# Patient Record
(demographics unavailable — no encounter records)

---

## 2024-11-25 NOTE — PHYSICAL EXAM
[de-identified] : Gen: NAD Head: NC/AT Eyes: wears glasses, no scleral icterus ENT: mucous membranes moist CV: No JVD Lungs: nonlabored breathing Abd: soft, NT/ND Ext: RLE: very limited flexion, TTP in the medial and lateral knee; LLE: RLE: very limited flexion, TTP in the medial and lateral knee Back: +TTP in the bilateral low lumbar facet region, +SLR bilaterally Neuro: CN intact LEs +5 L +5 R hip flexion +5 L +5 R leg extension +5 L +5 R leg flexion +5 L +5 R foot dorsiflexion +5 L +5 R foot plantarflexion +5 L +5 R EHL extension Psych: normal affect Skin: no visible lesions

## 2024-11-25 NOTE — PHYSICAL EXAM
[de-identified] : Gen: NAD Head: NC/AT Eyes: wears glasses, no scleral icterus ENT: mucous membranes moist CV: No JVD Lungs: nonlabored breathing Abd: soft, NT/ND Ext: RLE: very limited flexion, TTP in the medial and lateral knee; LLE: RLE: very limited flexion, TTP in the medial and lateral knee Back: +TTP in the bilateral low lumbar facet region, +SLR bilaterally Neuro: CN intact LEs +5 L +5 R hip flexion +5 L +5 R leg extension +5 L +5 R leg flexion +5 L +5 R foot dorsiflexion +5 L +5 R foot plantarflexion +5 L +5 R EHL extension Psych: normal affect Skin: no visible lesions

## 2024-11-25 NOTE — HISTORY OF PRESENT ILLNESS
[8] : 8 [Burning] : burning [Dull/Aching] : dull/aching [Radiating] : radiating [Stabbing] : stabbing [Throbbing] : throbbing [Tightness] : tightness [Tingling] : tingling [Intermittent] : intermittent [Household chores] : household chores [Leisure] : leisure [Sleep] : sleep [Physical therapy] : physical therapy [Injection therapy] : injection therapy [Walking] : walking [] : no [FreeTextEntry1] : bilateral knee pain  [FreeTextEntry6] : numbness and tingling in bilateral feet  [FreeTextEntry7] : Bilateral feet  [de-identified] : est 2017 [de-identified] : x-ray and MRI @[facility]

## 2024-11-25 NOTE — PHYSICAL EXAM
[de-identified] : Gen: NAD Head: NC/AT Eyes: wears glasses, no scleral icterus ENT: mucous membranes moist CV: No JVD Lungs: nonlabored breathing Abd: soft, NT/ND Ext: RLE: very limited flexion, TTP in the medial and lateral knee; LLE: RLE: very limited flexion, TTP in the medial and lateral knee Back: +TTP in the bilateral low lumbar facet region, +SLR bilaterally Neuro: CN intact LEs +5 L +5 R hip flexion +5 L +5 R leg extension +5 L +5 R leg flexion +5 L +5 R foot dorsiflexion +5 L +5 R foot plantarflexion +5 L +5 R EHL extension Psych: normal affect Skin: no visible lesions

## 2024-11-25 NOTE — DISCUSSION/SUMMARY
[de-identified] : ALEX ORR is a 83 year-old man presenting for a NPV for a history of low back and bilateral knee pain.    Prior treatment: Physical therapy x2 months CSI bilateral knees Lumbar DIANA  Patient has participated and failed at least 6 weeks of conservative therapy including physical therapy and a prescribed home exercise program as well as 6 weeks of activity modifications, including heat, ice, rest, and over the counter medications.   Plan: 1) MRI lumbar spine images reviewed with the patient. 2) Consider L4-L5 ILESI in the future.  3) Consider bilateral CSI knees in the future. Patient had this done approx 45 days ago so will defer.  4) Continue acetaminophen prn 5) RTC 6 weeks

## 2024-11-25 NOTE — DATA REVIEWED
[MRI] : MRI [Lumbar Spine] : lumbar spine [Report was reviewed and noted in the chart] : The report was reviewed and noted in the chart [I independently reviewed and interpreted images and report] : I independently reviewed and interpreted images and report [FreeTextEntry1] : 5/22/2024 X-ray bilateral knees ([facility]) IMPRESSION: No acute fracture or dislocation. Mild left medial compartment joint space narrowing. Small scattered marginal osteophytes. No osseous erosion or periosteal reaction. Trace/small right and moderate left knee joint effusions, nonspecific. No focal soft tissue abnormality.  5/24/2024 MRI Lumbar Spine ([facility]) FINDINGS:    RETROPERITONEUM AND SOFT TISSUES:  Retroperitoneal structures demonstrate normal appearance.  Paraspinal musculature demonstrates symmetric size and signal.  No superficial soft tissue abnormalities identified.  OSSEOUS STRUCTURES:  Vertebral bodies demonstrate normal marrow signal.  Normal alignment. Disc desiccation and spondylosis in the lower lumbar spine. Facet arthropathy in the mid to lower lumbar spine. Slight retrolisthesis of on L3 on L4. No evidence for aggressive osseous lesions appreciated. No evidence for fracture.  Conus medullaris noted posterior to T12-L1.  L1-L2 level demonstrates no disc bulge or herniation. No foraminal impingement or canal stenosis.  L2-L3 level demonstrates no disc bulge or herniation. No foraminal impingement or canal stenosis.  L3-L4 level demonstrates slight retrolisthesis with a disc bulge and annular fissure causing impingement upon the anterior thecal sac and bilateral neural foramina. Contact with the anterior bilateral L4 nerve roots.  L4-L5 level demonstrates a disc bulge with annular fissure causing impingement upon the anterior thecal sac and bilateral neural foramina. Contact with the anterior bilateral L5 nerve roots.  L5-S1 level demonstrates a disc bulge with annular fissure causing impingement upon the anterior thecal sac and bilateral neural foramina. Contact with the exiting bilateral L5 spinal nerves and anterior S1 nerve roots.  IMPRESSION:   1.  L3-L4 level demonstrates slight retrolisthesis with a disc bulge and annular fissure causing impingement upon the anterior thecal sac and bilateral neural foramina. Contact with the anterior bilateral L4 nerve roots. 2.  L4-L5 level demonstrates a disc bulge with annular fissure causing impingement upon the anterior thecal sac and bilateral neural foramina. Contact with the anterior bilateral L5 nerve roots. 3.  L5-S1 level demonstrates a disc bulge with annular fissure causing impingement upon the anterior thecal sac and bilateral neural foramina. Contact with the exiting bilateral L5 spinal nerves and anterior S1 nerve roots.

## 2024-11-25 NOTE — DISCUSSION/SUMMARY
[de-identified] : ALEX ORR is a 83 year-old man presenting for a NPV for a history of low back and bilateral knee pain.    Prior treatment: Physical therapy x2 months CSI bilateral knees Lumbar DIANA  Patient has participated and failed at least 6 weeks of conservative therapy including physical therapy and a prescribed home exercise program as well as 6 weeks of activity modifications, including heat, ice, rest, and over the counter medications.   Plan: 1) MRI lumbar spine images reviewed with the patient. 2) Consider L4-L5 ILESI in the future.  3) Consider bilateral CSI knees in the future. Patient had this done approx 45 days ago so will defer.  4) Continue acetaminophen prn 5) RTC 6 weeks

## 2024-11-25 NOTE — DISCUSSION/SUMMARY
[de-identified] : ALEX ORR is a 83 year-old man presenting for a NPV for a history of low back and bilateral knee pain.    Prior treatment: Physical therapy x2 months CSI bilateral knees Lumbar DIANA  Patient has participated and failed at least 6 weeks of conservative therapy including physical therapy and a prescribed home exercise program as well as 6 weeks of activity modifications, including heat, ice, rest, and over the counter medications.   Plan: 1) MRI lumbar spine images reviewed with the patient. 2) Consider L4-L5 ILESI in the future.  3) Consider bilateral CSI knees in the future. Patient had this done approx 45 days ago so will defer.  4) Continue acetaminophen prn 5) RTC 6 weeks

## 2024-11-25 NOTE — HISTORY OF PRESENT ILLNESS
[8] : 8 [Burning] : burning [Dull/Aching] : dull/aching [Radiating] : radiating [Stabbing] : stabbing [Throbbing] : throbbing [Tightness] : tightness [Tingling] : tingling [Intermittent] : intermittent [Household chores] : household chores [Leisure] : leisure [Sleep] : sleep [Physical therapy] : physical therapy [Injection therapy] : injection therapy [Walking] : walking [] : no [FreeTextEntry1] : bilateral knee pain  [FreeTextEntry7] : Bilateral feet  [FreeTextEntry6] : numbness and tingling in bilateral feet  [de-identified] : est 2017 [de-identified] : x-ray and MRI @[facility]

## 2025-01-07 NOTE — PHYSICAL EXAM
[de-identified] : Gen: NAD Head: NC/AT Eyes: wears glasses, no scleral icterus ENT: mucous membranes moist CV: No JVD Lungs: nonlabored breathing Abd: soft, NT/ND Ext: RLE: very limited flexion, TTP in the medial and lateral knee; LLE: RLE: very limited flexion, TTP in the medial and lateral knee Back: +TTP in the bilateral low lumbar facet region, +SLR bilaterally Neuro: CN intact LEs +5 L +5 R hip flexion +5 L +5 R leg extension +5 L +5 R leg flexion +5 L +5 R foot dorsiflexion +5 L +5 R foot plantarflexion +5 L +5 R EHL extension Psych: normal affect Skin: no visible lesions

## 2025-01-07 NOTE — HISTORY OF PRESENT ILLNESS
[8] : 8 [Burning] : burning [Dull/Aching] : dull/aching [Radiating] : radiating [Stabbing] : stabbing [Throbbing] : throbbing [Tightness] : tightness [Tingling] : tingling [Intermittent] : intermittent [Household chores] : household chores [Leisure] : leisure [Sleep] : sleep [Physical therapy] : physical therapy [Injection therapy] : injection therapy [Walking] : walking [10] : 10 [] : no [FreeTextEntry1] : bilateral knee pain  [FreeTextEntry6] : numbness and tingling in bilateral feet  [FreeTextEntry7] : Bilateral feet  [de-identified] : est 2017 [de-identified] : x-ray and MRI @[facility]

## 2025-01-07 NOTE — PROCEDURE
[Large Joint Injection] : Large joint injection [Bilateral] : bilaterally of the [Knee] : knee [Pain] : pain [Alcohol] : alcohol [Ethyl Chloride sprayed topically] : ethyl chloride sprayed topically [___ cc    0.25%] : Bupivacaine (Marcaine) ~Vcc of 0.25%  [___ cc    80mg] : Methylprednisolone (Depomedrol) ~Vcc of 80 mg  [] : Patient tolerated procedure well

## 2025-01-07 NOTE — DISCUSSION/SUMMARY
[de-identified] : ALEX ORR is a 83 year-old man presenting for a RPV for a history of low back and bilateral knee pain.    Prior treatment: Physical therapy x2 months CSI bilateral knees Lumbar DIANA  Patient has participated and failed at least 6 weeks of conservative therapy including physical therapy and a prescribed home exercise program as well as 6 weeks of activity modifications, including heat, ice, rest, and over the counter medications.   Plan: 1) MRI lumbar spine images reviewed with the patient. 2) Consider L4-L5 ILESI in the future.  3) Bilateral knee CSI today. The procedure was explained to the patient in detail. Reviewed risks, benefits, and alternatives with the patient. Some risks discussed included temporary increase in pain, bleeding, infection, and side effects from steroids. The patient expressed understanding and would like to proceed.  4) Continue acetaminophen prn 5) RTC 3 months

## 2025-01-07 NOTE — HISTORY OF PRESENT ILLNESS
[8] : 8 [Burning] : burning [Dull/Aching] : dull/aching [Radiating] : radiating [Stabbing] : stabbing [Throbbing] : throbbing [Tightness] : tightness [Tingling] : tingling [Intermittent] : intermittent [Household chores] : household chores [Leisure] : leisure [Sleep] : sleep [Physical therapy] : physical therapy [Injection therapy] : injection therapy [Walking] : walking [10] : 10 [] : no [FreeTextEntry1] : bilateral knee pain  [FreeTextEntry6] : numbness and tingling in bilateral feet  [FreeTextEntry7] : Bilateral feet  [de-identified] : est 2017 [de-identified] : x-ray and MRI @[facility]

## 2025-01-07 NOTE — PHYSICAL EXAM
[de-identified] : Gen: NAD Head: NC/AT Eyes: wears glasses, no scleral icterus ENT: mucous membranes moist CV: No JVD Lungs: nonlabored breathing Abd: soft, NT/ND Ext: RLE: very limited flexion, TTP in the medial and lateral knee; LLE: RLE: very limited flexion, TTP in the medial and lateral knee Back: +TTP in the bilateral low lumbar facet region, +SLR bilaterally Neuro: CN intact LEs +5 L +5 R hip flexion +5 L +5 R leg extension +5 L +5 R leg flexion +5 L +5 R foot dorsiflexion +5 L +5 R foot plantarflexion +5 L +5 R EHL extension Psych: normal affect Skin: no visible lesions

## 2025-01-07 NOTE — DISCUSSION/SUMMARY
[de-identified] : ALEX ORR is a 83 year-old man presenting for a RPV for a history of low back and bilateral knee pain.    Prior treatment: Physical therapy x2 months CSI bilateral knees Lumbar DIANA  Patient has participated and failed at least 6 weeks of conservative therapy including physical therapy and a prescribed home exercise program as well as 6 weeks of activity modifications, including heat, ice, rest, and over the counter medications.   Plan: 1) MRI lumbar spine images reviewed with the patient. 2) Consider L4-L5 ILESI in the future.  3) Bilateral knee CSI today. The procedure was explained to the patient in detail. Reviewed risks, benefits, and alternatives with the patient. Some risks discussed included temporary increase in pain, bleeding, infection, and side effects from steroids. The patient expressed understanding and would like to proceed.  4) Continue acetaminophen prn 5) RTC 3 months

## 2025-04-10 NOTE — PHYSICAL EXAM
[de-identified] : Gen: NAD Head: NC/AT Eyes: wears glasses, no scleral icterus ENT: mucous membranes moist CV: No JVD Lungs: nonlabored breathing Abd: soft, NT/ND Ext: RLE: very limited flexion, TTP in the medial and lateral knee; LLE: RLE: very limited flexion, TTP in the medial and lateral knee Back: +TTP in the bilateral low lumbar facet region, +SLR bilaterally Neuro: CN intact LEs +5 L +5 R hip flexion +5 L +5 R leg extension +5 L +5 R leg flexion +5 L +5 R foot dorsiflexion +5 L +5 R foot plantarflexion +5 L +5 R EHL extension Psych: normal affect Skin: no visible lesions

## 2025-04-10 NOTE — HISTORY OF PRESENT ILLNESS
[10] : 10 [8] : 8 [Burning] : burning [Dull/Aching] : dull/aching [Radiating] : radiating [Stabbing] : stabbing [Throbbing] : throbbing [Tightness] : tightness [Tingling] : tingling [Intermittent] : intermittent [Household chores] : household chores [Leisure] : leisure [Sleep] : sleep [Physical therapy] : physical therapy [Injection therapy] : injection therapy [Walking] : walking [9] : 9 [7] : 7 [] : no [FreeTextEntry1] : bilateral knee pain  [FreeTextEntry6] : numbness and tingling in bilateral feet  [FreeTextEntry7] : Bilateral feet  [de-identified] : est 2017 [de-identified] : x-ray and MRI @[facility]

## 2025-04-10 NOTE — DISCUSSION/SUMMARY
[de-identified] : ALEX ORR is a 83 year-old man presenting for a RPV for a history of low back and bilateral knee pain.    Prior treatment: Physical therapy x2 months 1/7/2025: CSI bilateral knees Lumbar DIANA  Patient has participated and failed at least 6 weeks of conservative therapy including physical therapy and a prescribed home exercise program as well as 6 weeks of activity modifications, including heat, ice, rest, and over the counter medications.   Plan: 1) MRI lumbar spine images reviewed with the patient. 2) Consider L4-L5 ILESI in the future.  3) Bilateral knee CSI today. The procedure was explained to the patient in detail. Reviewed risks, benefits, and alternatives with the patient. Some risks discussed included temporary increase in pain, bleeding, infection, and side effects from steroids. The patient expressed understanding and would like to proceed.  4) Continue acetaminophen prn 5) RTC 3 months

## 2025-04-10 NOTE — DISCUSSION/SUMMARY
[de-identified] : ALEX ORR is a 83 year-old man presenting for a RPV for a history of low back and bilateral knee pain.    Prior treatment: Physical therapy x2 months 1/7/2025: CSI bilateral knees Lumbar DIANA  Patient has participated and failed at least 6 weeks of conservative therapy including physical therapy and a prescribed home exercise program as well as 6 weeks of activity modifications, including heat, ice, rest, and over the counter medications.   Plan: 1) MRI lumbar spine images reviewed with the patient. 2) Consider L4-L5 ILESI in the future.  3) Bilateral knee CSI today. The procedure was explained to the patient in detail. Reviewed risks, benefits, and alternatives with the patient. Some risks discussed included temporary increase in pain, bleeding, infection, and side effects from steroids. The patient expressed understanding and would like to proceed.  4) Continue acetaminophen prn 5) RTC 3 months

## 2025-04-10 NOTE — PHYSICAL EXAM
[de-identified] : Gen: NAD Head: NC/AT Eyes: wears glasses, no scleral icterus ENT: mucous membranes moist CV: No JVD Lungs: nonlabored breathing Abd: soft, NT/ND Ext: RLE: very limited flexion, TTP in the medial and lateral knee; LLE: RLE: very limited flexion, TTP in the medial and lateral knee Back: +TTP in the bilateral low lumbar facet region, +SLR bilaterally Neuro: CN intact LEs +5 L +5 R hip flexion +5 L +5 R leg extension +5 L +5 R leg flexion +5 L +5 R foot dorsiflexion +5 L +5 R foot plantarflexion +5 L +5 R EHL extension Psych: normal affect Skin: no visible lesions

## 2025-04-10 NOTE — HISTORY OF PRESENT ILLNESS
[10] : 10 [8] : 8 [Burning] : burning [Dull/Aching] : dull/aching no [Radiating] : radiating [Stabbing] : stabbing [Throbbing] : throbbing [Tightness] : tightness [Tingling] : tingling [Intermittent] : intermittent [Household chores] : household chores [Leisure] : leisure [Sleep] : sleep [Physical therapy] : physical therapy [Injection therapy] : injection therapy [Walking] : walking [9] : 9 [7] : 7 [] : no [FreeTextEntry1] : bilateral knee pain  [FreeTextEntry6] : numbness and tingling in bilateral feet  [FreeTextEntry7] : Bilateral feet  [de-identified] : est 2017 [de-identified] : x-ray and MRI @[facility]

## 2025-07-30 NOTE — DISCUSSION/SUMMARY
[de-identified] : ALEX ORR is a 83 year-old man presenting for a RPV for a history of low back and bilateral knee pain.    Prior treatment: Physical therapy x2 months 1/7/2025: CSI bilateral knees Lumbar DIANA  Patient has participated and failed at least 6 weeks of conservative therapy including physical therapy and a prescribed home exercise program as well as 6 weeks of activity modifications, including heat, ice, rest, and over the counter medications.   Plan: 1) MRI lumbar spine images reviewed with the patient. 2) Consider L4-L5 ILESI in the future.  3) Bilateral knee CSI today. The procedure was explained to the patient in detail. Reviewed risks, benefits, and alternatives with the patient. Some risks discussed included temporary increase in pain, bleeding, infection, and side effects from steroids. The patient expressed understanding and would like to proceed.  4) Continue acetaminophen prn 5) RTC 3 months

## 2025-07-30 NOTE — PHYSICAL EXAM
[de-identified] : Gen: NAD Head: NC/AT Eyes: wears glasses, no scleral icterus ENT: mucous membranes moist CV: No JVD Lungs: nonlabored breathing Abd: soft, NT/ND Ext: RLE: very limited flexion, TTP in the medial and lateral knee; LLE: RLE: very limited flexion, TTP in the medial and lateral knee Back: +TTP in the bilateral low lumbar facet region, +SLR bilaterally Neuro: CN intact LEs +5 L +5 R hip flexion +5 L +5 R leg extension +5 L +5 R leg flexion +5 L +5 R foot dorsiflexion +5 L +5 R foot plantarflexion +5 L +5 R EHL extension Psych: normal affect Skin: no visible lesions

## 2025-07-30 NOTE — HISTORY OF PRESENT ILLNESS
[9] : 9 [7] : 7 [Burning] : burning [Dull/Aching] : dull/aching [Radiating] : radiating [Stabbing] : stabbing [Throbbing] : throbbing [Tightness] : tightness [Tingling] : tingling [Intermittent] : intermittent [Household chores] : household chores [Leisure] : leisure [Sleep] : sleep [Physical therapy] : physical therapy [Injection therapy] : injection therapy [Walking] : walking [] : yes [FreeTextEntry1] : bilateral knee pain  [FreeTextEntry6] : numbness and tingling in bilateral feet  [FreeTextEntry7] : Bilateral feet  [de-identified] : est 2017 [de-identified] : x-ray and MRI [facility]

## 2025-07-30 NOTE — PHYSICAL EXAM
[de-identified] : Gen: NAD Head: NC/AT Eyes: wears glasses, no scleral icterus ENT: mucous membranes moist CV: No JVD Lungs: nonlabored breathing Abd: soft, NT/ND Ext: RLE: very limited flexion, TTP in the medial and lateral knee; LLE: RLE: very limited flexion, TTP in the medial and lateral knee Back: +TTP in the bilateral low lumbar facet region, +SLR bilaterally Neuro: CN intact LEs +5 L +5 R hip flexion +5 L +5 R leg extension +5 L +5 R leg flexion +5 L +5 R foot dorsiflexion +5 L +5 R foot plantarflexion +5 L +5 R EHL extension Psych: normal affect Skin: no visible lesions

## 2025-07-30 NOTE — DISCUSSION/SUMMARY
[de-identified] : ALEX ORR is a 83 year-old man presenting for a RPV for a history of low back and bilateral knee pain.    Prior treatment: Physical therapy x2 months 1/7/2025: CSI bilateral knees Lumbar DIANA  Patient has participated and failed at least 6 weeks of conservative therapy including physical therapy and a prescribed home exercise program as well as 6 weeks of activity modifications, including heat, ice, rest, and over the counter medications.   Plan: 1) MRI lumbar spine images reviewed with the patient. 2) Consider L4-L5 ILESI in the future.  3) Bilateral knee CSI today. The procedure was explained to the patient in detail. Reviewed risks, benefits, and alternatives with the patient. Some risks discussed included temporary increase in pain, bleeding, infection, and side effects from steroids. The patient expressed understanding and would like to proceed.  4) Continue acetaminophen prn 5) RTC 3 months

## 2025-07-30 NOTE — DATA REVIEWED
[MRI] : MRI [Lumbar Spine] : lumbar spine [Report was reviewed and noted in the chart] : The report was reviewed and noted in the chart [I independently reviewed and interpreted images and report] : I independently reviewed and interpreted images and report [FreeTextEntry1] : 5/22/2024 X-ray bilateral knees ([facility]) IMPRESSION: No acute fracture or dislocation. Mild left medial compartment joint space narrowing. Small scattered marginal osteophytes. No osseous erosion or periosteal reaction. Trace/small right and moderate left knee joint effusions, nonspecific. No focal soft tissue abnormality.  5/24/2024 MRI Lumbar Spine ([facility]) FINDINGS:    RETROPERITONEUM AND SOFT TISSUES:  Retroperitoneal structures demonstrate normal appearance.  Paraspinal musculature demonstrates symmetric size and signal.  No superficial soft tissue abnormalities identified.  OSSEOUS STRUCTURES:  Vertebral bodies demonstrate normal marrow signal.  Normal alignment. Disc desiccation and spondylosis in the lower lumbar spine. Facet arthropathy in the mid to lower lumbar spine. Slight retrolisthesis of on L3 on L4. No evidence for aggressive osseous lesions appreciated. No evidence for fracture.  Conus medullaris noted posterior to T12-L1.  L1-L2 level demonstrates no disc bulge or herniation. No foraminal impingement or canal stenosis.  L2-L3 level demonstrates no disc bulge or herniation. No foraminal impingement or canal stenosis.  L3-L4 level demonstrates slight retrolisthesis with a disc bulge and annular fissure causing impingement upon the anterior thecal sac and bilateral neural foramina. Contact with the anterior bilateral L4 nerve roots.  L4-L5 level demonstrates a disc bulge with annular fissure causing impingement upon the anterior thecal sac and bilateral neural foramina. Contact with the anterior bilateral L5 nerve roots.  L5-S1 level demonstrates a disc bulge with annular fissure causing impingement upon the anterior thecal sac and bilateral neural foramina. Contact with the exiting bilateral L5 spinal nerves and anterior S1 nerve roots.  IMPRESSION:   1.  L3-L4 level demonstrates slight retrolisthesis with a disc bulge and annular fissure causing impingement upon the anterior thecal sac and bilateral neural foramina. Contact with the anterior bilateral L4 nerve roots. 2.  L4-L5 level demonstrates a disc bulge with annular fissure causing impingement upon the anterior thecal sac and bilateral neural foramina. Contact with the anterior bilateral L5 nerve roots. 3.  L5-S1 level demonstrates a disc bulge with annular fissure causing impingement upon the anterior thecal sac and bilateral neural foramina. Contact with the exiting bilateral L5 spinal nerves and anterior S1 nerve roots. 9 (severe pain)

## 2025-07-30 NOTE — HISTORY OF PRESENT ILLNESS
[9] : 9 [7] : 7 [Burning] : burning [Dull/Aching] : dull/aching [Radiating] : radiating [Stabbing] : stabbing [Throbbing] : throbbing [Tightness] : tightness [Tingling] : tingling [Intermittent] : intermittent [Household chores] : household chores [Leisure] : leisure [Sleep] : sleep [Physical therapy] : physical therapy [Injection therapy] : injection therapy [Walking] : walking [] : yes [FreeTextEntry1] : bilateral knee pain  [FreeTextEntry6] : numbness and tingling in bilateral feet  [FreeTextEntry7] : Bilateral feet  [de-identified] : est 2017 [de-identified] : x-ray and MRI @[facility]